# Patient Record
Sex: FEMALE | URBAN - METROPOLITAN AREA
[De-identification: names, ages, dates, MRNs, and addresses within clinical notes are randomized per-mention and may not be internally consistent; named-entity substitution may affect disease eponyms.]

---

## 2022-11-18 ENCOUNTER — OFFICE VISIT (OUTPATIENT)
Dept: URBAN - METROPOLITAN AREA CLINIC 6 | Facility: CLINIC | Age: 77
End: 2022-11-18
Payer: MEDICARE

## 2022-11-18 DIAGNOSIS — H04.123 DRY EYE SYNDROME OF BILATERAL LACRIMAL GLANDS: Primary | ICD-10-CM

## 2022-11-18 PROCEDURE — 92012 INTRM OPH EXAM EST PATIENT: CPT | Performed by: OPTOMETRIST

## 2022-11-18 ASSESSMENT — INTRAOCULAR PRESSURE
OD: 8
OS: 7

## 2022-12-15 ENCOUNTER — OFFICE VISIT (OUTPATIENT)
Dept: URBAN - METROPOLITAN AREA CLINIC 6 | Facility: CLINIC | Age: 77
End: 2022-12-15
Payer: MEDICARE

## 2022-12-15 DIAGNOSIS — H16.223 BILATERAL KERATOCONJUNCTIVITIS SICCA, NOT SPECIFIED AS SJOGREN'S: ICD-10-CM

## 2022-12-15 DIAGNOSIS — H26.493 OTHER SECONDARY CATARACT, BILATERAL: Primary | ICD-10-CM

## 2022-12-15 DIAGNOSIS — H02.834 DERMATOCHALASIS OF LEFT UPPER EYELID: ICD-10-CM

## 2022-12-15 DIAGNOSIS — H02.831 DERMATOCHALASIS OF RIGHT UPPER EYELID: ICD-10-CM

## 2022-12-15 PROCEDURE — 92014 COMPRE OPH EXAM EST PT 1/>: CPT | Performed by: OPTOMETRIST

## 2022-12-15 RX ORDER — CYCLOSPORINE 0.5 MG/ML
0.05 % EMULSION OPHTHALMIC
Qty: 180 | Refills: 3 | Status: ACTIVE
Start: 2022-12-15

## 2022-12-15 ASSESSMENT — INTRAOCULAR PRESSURE
OD: 9
OS: 10

## 2022-12-15 NOTE — IMPRESSION/PLAN
Impression: Bilateral keratoconjunctivitis sicca, not specified as Sjogren's: S69.862. Plan: Keratoconjunctivitis Sicca both eyes -  No improvement with artificial tears. Patient has decreased tear lake, SPK, and decreased TBUT. Start Restasis BID OU and continue ATs OU.

## 2023-05-02 ENCOUNTER — OFFICE VISIT (OUTPATIENT)
Dept: URBAN - METROPOLITAN AREA CLINIC 6 | Facility: CLINIC | Age: 78
End: 2023-05-02
Payer: MEDICARE

## 2023-05-02 DIAGNOSIS — H16.223 BILATERAL KERATOCONJUNCTIVITIS SICCA, NOT SPECIFIED AS SJOGREN'S: Primary | ICD-10-CM

## 2023-05-02 PROCEDURE — 92012 INTRM OPH EXAM EST PATIENT: CPT | Performed by: OPTOMETRIST

## 2023-05-02 RX ORDER — CYCLOSPORINE 0.5 MG/ML
0.05 % EMULSION OPHTHALMIC
Qty: 180 | Refills: 3 | Status: ACTIVE
Start: 2023-05-02

## 2023-05-02 ASSESSMENT — INTRAOCULAR PRESSURE
OS: 10
OD: 11

## 2023-05-02 NOTE — IMPRESSION/PLAN
Impression: Bilateral keratoconjunctivitis sicca, not specified as Sjogren's: W33.284. Plan: Keratoconjunctivitis Sicca both eyes -  No improvement with artificial tears. Patient has decreased tear lake, SPK, and decreased TBUT. Start Restasis BID OU and continue artificial tears OU. Pt never received Restasis.

## 2023-08-31 ENCOUNTER — OFFICE VISIT (OUTPATIENT)
Dept: URBAN - METROPOLITAN AREA CLINIC 6 | Facility: CLINIC | Age: 78
End: 2023-08-31
Payer: MEDICARE

## 2023-08-31 DIAGNOSIS — H16.223 BILATERAL KERATOCONJUNCTIVITIS SICCA, NOT SPECIFIED AS SJOGREN'S: Primary | ICD-10-CM

## 2023-08-31 PROCEDURE — 92012 INTRM OPH EXAM EST PATIENT: CPT | Performed by: OPTOMETRIST

## 2023-08-31 ASSESSMENT — INTRAOCULAR PRESSURE
OS: 10
OD: 10

## 2023-09-25 NOTE — IMPRESSION/PLAN
Impression: Other secondary cataract, bilateral: H26.493. Plan: Posterior capsular opacity both eyes (common clouding behind the intraocular lens implant) - At this time does not appear visually significant, so will hold off on intervention. Patient advised to contact us for any decrease vision, glare, or other visual problems. no

## 2023-10-06 ENCOUNTER — OFFICE VISIT (OUTPATIENT)
Dept: URBAN - METROPOLITAN AREA CLINIC 6 | Facility: CLINIC | Age: 78
End: 2023-10-06
Payer: MEDICARE

## 2023-10-06 DIAGNOSIS — H16.223 BILATERAL KERATOCONJUNCTIVITIS SICCA, NOT SPECIFIED AS SJOGREN'S: Primary | ICD-10-CM

## 2023-10-06 PROCEDURE — 92012 INTRM OPH EXAM EST PATIENT: CPT | Performed by: OPTOMETRIST

## 2023-10-06 ASSESSMENT — INTRAOCULAR PRESSURE
OD: 10
OS: 9